# Patient Record
Sex: MALE | Race: WHITE | URBAN - METROPOLITAN AREA
[De-identification: names, ages, dates, MRNs, and addresses within clinical notes are randomized per-mention and may not be internally consistent; named-entity substitution may affect disease eponyms.]

---

## 2017-06-23 ENCOUNTER — HOSPITAL ENCOUNTER (OUTPATIENT)
Dept: SURGERY | Age: 47
Discharge: HOME | End: 2017-06-23
Attending: INTERNAL MEDICINE | Admitting: INTERNAL MEDICINE

## 2017-06-23 VITALS
HEIGHT: 70 IN | WEIGHT: 150 LBS | HEART RATE: 60 BPM | DIASTOLIC BLOOD PRESSURE: 78 MMHG | SYSTOLIC BLOOD PRESSURE: 118 MMHG | TEMPERATURE: 98.7 F | RESPIRATION RATE: 16 BRPM | OXYGEN SATURATION: 96 % | BODY MASS INDEX: 21.47 KG/M2

## 2017-06-23 DIAGNOSIS — Z83.71 FAMILY HISTORY OF COLONIC POLYPS: ICD-10-CM

## 2017-06-23 RX ORDER — LIDOCAINE HYDROCHLORIDE 10 MG/ML
0.1 INJECTION, SOLUTION EPIDURAL; INFILTRATION; INTRACAUDAL; PERINEURAL ONCE
Status: DISCONTINUED | OUTPATIENT
Start: 2017-06-23 | End: 2017-06-24 | Stop reason: HOSPADM

## 2017-06-23 RX ORDER — SODIUM CHLORIDE, SODIUM LACTATE, POTASSIUM CHLORIDE, CALCIUM CHLORIDE 600; 310; 30; 20 MG/100ML; MG/100ML; MG/100ML; MG/100ML
INJECTION, SOLUTION INTRAVENOUS CONTINUOUS
Status: DISCONTINUED | OUTPATIENT
Start: 2017-06-23 | End: 2017-06-24 | Stop reason: HOSPADM

## 2017-06-23 RX ADMIN — SODIUM CHLORIDE, SODIUM LACTATE, POTASSIUM CHLORIDE, CALCIUM CHLORIDE: 600; 310; 30; 20 INJECTION, SOLUTION INTRAVENOUS at 07:22

## 2017-06-23 ASSESSMENT — PAIN SCALES - GENERAL
PAINLEVEL_OUTOF10: 0

## 2017-06-23 ASSESSMENT — PAIN - FUNCTIONAL ASSESSMENT: PAIN_FUNCTIONAL_ASSESSMENT: 0-10

## 2020-06-02 ENCOUNTER — OFFICE VISIT (OUTPATIENT)
Dept: PRIMARY CARE CLINIC | Age: 50
End: 2020-06-02

## 2020-06-02 PROCEDURE — 99211 OFF/OP EST MAY X REQ PHY/QHP: CPT | Performed by: NURSE PRACTITIONER

## 2020-06-02 NOTE — PROGRESS NOTES
FLU/COVID-19 CLINIC EVALUATION  HPI:Patient has come through the [facility] to be tested for COVID-19. They have concern about possible exposure. They are requesting COVID-19 testing. Symptom duration, days:  [] 1   [] 2    []3   [] 4    []5    []6   [] 7    []8    []9   [] 10    []11 []  12   [] 13    []14 or greater    There were no vitals filed for this visit. ROS:Review of Systems   All other systems reviewed and are negative. []Fevers   []Headaches  []Sore throat  []Chest pain/heaviness  []Muscle aches  []Feeling short of breath  []Nausea   []Nasal Congestion  []Vomiting  []Chest Congestion    []Diarrhea  []Cough  []Loss of taste/smell  []Chills    OTHER SYMPTOMS:      RISK FACTORS:  []Pregnancy   []Diabetes  []Heart disease  []Asthma  []COPD/Other chronic lung diseases  []Active Cancer/Chemotherapy   []Taking oral steroids  []Close contact with a lab confirmed COVID-19 patient within 14 days of symptom onset  []Health Care Worker Exposure no symptoms  []Health Care Worker Exposure symptomatic    Assessment  Physical Exam    Constitutional:       Appearance: Normal appearance. HENT:      Head: Normocephalic and atraumatic. Mouth/Throat:      Mouth: Mucous membranes are moist.   Neck:      Musculoskeletal: Normal range of motion. Cardiovascular:     Skin pink and warm     Pulmonary:      Effort: Pulmonary effort is normal.   Musculoskeletal: Normal range of motion. Skin:     General: Skin is warm and dry. Neurological:      General: No focal deficit present. Mental Status: Alert. Mental status is at baseline. Test ordered:    [x]COVID    _________  []Strep    ___________  []Flu       _________      Diagnosis and Plan:      Diagnosis Orders   1. Suspected COVID-19 virus infection  Covid-19 Ambulatory     COVID-19 swab complete at clinic and sent to lab for testing. Quarantine order in place, patient verbalized understanding. Preventing the spread of Coronavirus, Possible

## 2020-06-05 LAB
SARS-COV-2: NOT DETECTED
SOURCE: NORMAL

## 2023-07-19 ENCOUNTER — HOSPITAL ENCOUNTER (EMERGENCY)
Age: 53
Discharge: HOME | End: 2023-07-19
Payer: COMMERCIAL

## 2023-07-19 VITALS
HEIGHT: 70 IN | HEART RATE: 66 BPM | RESPIRATION RATE: 16 BRPM | WEIGHT: 150 LBS | SYSTOLIC BLOOD PRESSURE: 124 MMHG | OXYGEN SATURATION: 100 % | TEMPERATURE: 98.4 F | DIASTOLIC BLOOD PRESSURE: 84 MMHG | BODY MASS INDEX: 21.47 KG/M2

## 2023-07-19 DIAGNOSIS — S01.01XA LACERATION OF SCALP, INITIAL ENCOUNTER: Primary | ICD-10-CM

## 2023-07-19 PROCEDURE — 12011 RPR F/E/E/N/L/M 2.5 CM/<: CPT

## 2023-07-19 PROCEDURE — 99282 EMERGENCY DEPT VISIT SF MDM: CPT

## 2023-07-19 PROCEDURE — 12001 RPR S/N/AX/GEN/TRNK 2.5CM/<: CPT

## 2023-07-19 ASSESSMENT — LIFESTYLE VARIABLES
HOW MANY STANDARD DRINKS CONTAINING ALCOHOL DO YOU HAVE ON A TYPICAL DAY: PATIENT DOES NOT DRINK
HOW OFTEN DO YOU HAVE A DRINK CONTAINING ALCOHOL: NEVER

## 2023-07-19 ASSESSMENT — PAIN - FUNCTIONAL ASSESSMENT: PAIN_FUNCTIONAL_ASSESSMENT: 0-10

## 2023-07-19 ASSESSMENT — PAIN SCALES - GENERAL: PAINLEVEL_OUTOF10: 2

## 2023-07-20 NOTE — ED PROVIDER NOTES
[facility] Emergency Department    CHIEF COMPLAINT  Laceration (Laceration behind R ear from hitting a pole playing pickle ball. Edy loss of consciousness. Went home to shower and after his shower the bleeding had stopped. Butterflies placed by wife.  )      SHARED SERVICE VISIT  Evaluated by KURTIS. My supervising physician was available for consultation. HISTORY OF PRESENT ILLNESS  Bulmaro Davila is a 48 y.o. male who presents to the ED complaining of laceration pain to his ear. He says it was palpable earlier today when he fell and ran into a pole. He did hit his head, however he denies any loss consciousness, headache, nausea or vomiting. Denies any disorientation. He says after the incident he went to shower vital staying noticed blood. He is not up-to-date on his tetanus shot, however he does not want 1 here in the emergency department he will go to his primary care tomorrow and get a tetanus booster. He denies any body aches, fevers or chills. He denies any coughing or sneezing. He denies any sore throat. He denies any chest pain, shortness of breath, dyspnea on exertion. He denies any nausea, vomiting, diarrhea, or abdominal pain. He denies any urinary symptoms. He denies any new onset back pain. Denies any recent travel or sick contacts. No other complaints, modifying factors or associated symptoms. Nursing notes reviewed.    Past Medical History:   Diagnosis Date    Seasonal allergies      Past Surgical History:   Procedure Laterality Date    COLONOSCOPY  2006    COLONOSCOPY  1/7/11    diverticulosis    COLONOSCOPY  2017    CORNEAL TRANSPLANT  t    lt. eye    HERNIA REPAIR  2017    bilateral inguinal     Family History   Problem Relation Age of Onset    Cancer Father     Cancer Paternal Grandfather      Social History     Socioeconomic History    Marital status:      Spouse name: Not on file    Number of children: Not on file    Years of

## 2024-12-19 ENCOUNTER — OFFICE VISIT (OUTPATIENT)
Dept: ORTHOPEDIC SURGERY | Age: 54
End: 2024-12-19
Payer: COMMERCIAL

## 2024-12-19 VITALS — BODY MASS INDEX: 20.9 KG/M2 | WEIGHT: 146 LBS | HEIGHT: 70 IN

## 2024-12-19 DIAGNOSIS — M25.512 CHRONIC LEFT SHOULDER PAIN: Primary | ICD-10-CM

## 2024-12-19 DIAGNOSIS — G89.29 CHRONIC LEFT SHOULDER PAIN: Primary | ICD-10-CM

## 2024-12-19 DIAGNOSIS — S43.431A LABRAL TEAR OF SHOULDER, RIGHT, INITIAL ENCOUNTER: ICD-10-CM

## 2024-12-19 PROCEDURE — 99204 OFFICE O/P NEW MOD 45 MIN: CPT | Performed by: INTERNAL MEDICINE

## 2024-12-19 NOTE — PROGRESS NOTES
Chief Complaint:   Chief Complaint   Patient presents with    Shoulder Pain     Left Shoulder - It started about 5 years ago as a dull ache. It gets aggravated with activity and golf. 3 years ago he got a csi and it help for awhile but not totally. Now it is worse.           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 The symptoms began 5 plus yearsago and may have started with a weight training injury. The patient describes a aching pain that does not radiate.  The symptoms are constant  and are worsening since the onset.       The symptoms do not show a typical rotator cuff provacative pattern. The pain is  anterior about the shoulder.There is not associated mechanical symptoms localizing to the shoulder. The patient denies symptoms of instability about the shoulder.     Treatment to date has included corticosteroid injection which was temporarily beneficial performed in 2022 followed by a course of home exercises    Pain levels 3-7.    The symptoms do not correlate with head and neck movement. The patient admits to  weakness of the upper extremity.    The patient does not know have history of prior shoulder trauma.     Workup has included:X-ray    There is no history or autoimmune disease, inflammatory arthropathy or crystal arthropathy.          Past Medical History:        Past Medical History:   Diagnosis Date    Seasonal allergies          Past Surgical History:   Procedure Laterality Date    COLONOSCOPY  2006    COLONOSCOPY  1/7/11    diverticulosis    COLONOSCOPY  2017    CORNEAL TRANSPLANT  t    lt. eye    HERNIA REPAIR  2017    bilateral inguinal         Present Medications:         Current Outpatient Medications   Medication Sig Dispense Refill    therapeutic multivitamin-minerals (THERAGRAN-M) tablet Take 1 tablet by mouth daily      vitamin D (CHOLECALCIFEROL) 400 UNIT TABS tablet Take 1 tablet by mouth daily       No current

## 2025-01-30 ENCOUNTER — TELEPHONE (OUTPATIENT)
Dept: ORTHOPEDIC SURGERY | Age: 55
End: 2025-01-30

## 2025-01-30 NOTE — TELEPHONE ENCOUNTER
Other PATIENT WANTS TO KNOW IF HE CAN GET ANOTHER ORDER SENT OVER FOR AN MRI ON THE LEFT SHOULDER BECAUSE HE DIDN'T GET THE FIRST ONE THAT WAS ORDERED       PLEASE ADVISE

## 2025-01-30 NOTE — TELEPHONE ENCOUNTER
I [facility] with the [facility]

## 2025-02-13 ENCOUNTER — TELEPHONE (OUTPATIENT)
Dept: ORTHOPEDIC SURGERY | Age: 55
End: 2025-02-13

## 2025-02-13 NOTE — TELEPHONE ENCOUNTER
General Question     Subject: MRI / JOHN   Patient and /or Facility Request: Xiang Tobias   Contact Number: [phone]     PATIENT CALLING TO SEE IF SOMEONE FROM OFFICE CAN GIVE HIM A CALL REGARDING MRI TR    I TOLD PATIENT HE WOULD HAVE TO JOHN A FOLLOW UP TO GET MRI RESULTS     PATIENT CAN ONLY COME DURING LUNCH TIME , WHICH IS DR. CHAND LUNCH TIME TOO     PLEASE CALL PATIENT AT THE ABOVE NUMBER

## 2025-02-14 NOTE — TELEPHONE ENCOUNTER
S/W pt, and he is concerned that he would not be able to get away for an OV since he has a new job, and he also would like to avoid another specialist copay.  I advised that while I do understand, we could switch to doing a VV instead.  Pt was open to this, but wanted to know if we could at least tell him if it was something serious vs nothing wrong at all, so he at least did not waste his time if the MRI came back completely normal.      MRI was completed 2/8/25, but the report is not yet completed.  This is unusual for [facility] to take this long, so I have contacted them to kindly complete the report and send ASAP.  I advised the pt of this and said that we would touch base once we had the results.  Pt v/u.

## 2025-02-18 NOTE — TELEPHONE ENCOUNTER
LVM for pt that we now have MRI results and images, so we need him to schedule a F/U to go over TR MRI and next steps.

## 2025-04-14 ENCOUNTER — TELEPHONE (OUTPATIENT)
Dept: ORTHOPEDIC SURGERY | Age: 55
End: 2025-04-14

## 2025-04-14 NOTE — TELEPHONE ENCOUNTER
----- Message from Leslie WELLS sent at 4/14/2025  2:04 PM EDT -----  Regarding: VIRTUAL VISIT/ REQ A CALL BACK  Specialty Message to Provider    Relationship to Patient: Self     Patient Message: PATIENT CALLED IN TO SEE IF HE CAN SPEAK TO SOMEONE IN THE OFFICE ABOUT GETTING AN VIRTUAL VISIT FOR HIS LT SHOULDER APPT. TRYING TO GET HELP WITH MY- CHART.. REQ A CALL BACK.. PLEASE ADVISE   --------------------------------------------------------------------------------------------------------------------------    Call Back Information: OK to leave message on voicemail  Preferred Call Back Number: [phone]

## 2025-04-18 NOTE — TELEPHONE ENCOUNTER
S/W pt, sent [employer] activation code to him, so he can view his results and communicate with us.    LOV 12/19/2024  MRI completed 2/16/25    Pt currently does not have insurance.  He is wondering if Dr. Erazo can please take a look at his MRI results and let him know if there is anything serious going on, and if so, he will schedule an appt (which would be self pay, OOP).  If there is not really anything wrong, then he cannot really afford to schedule at this time.    I asked pt how his shoulder was doing, since that plays a big role on whether or not he should come in.  Pt states that shoulder actually feels pretty good at this time, about 95% normal.  He has been lifting weights with workouts, though he is cautious with what he does with his shlds and does not overdo and has been fine with this.  Pt states that he has not tried golfing yet, and is worried that this will irritate his shoulder.     Please advise, if anything significant is showing on his MRI, and if you think he needs to schedule an appt to discuss.